# Patient Record
Sex: FEMALE | Race: WHITE | NOT HISPANIC OR LATINO | URBAN - METROPOLITAN AREA
[De-identification: names, ages, dates, MRNs, and addresses within clinical notes are randomized per-mention and may not be internally consistent; named-entity substitution may affect disease eponyms.]

---

## 2019-04-30 ENCOUNTER — EMERGENCY (EMERGENCY)
Facility: HOSPITAL | Age: 84
LOS: 0 days | Discharge: HOME | End: 2019-04-30
Attending: STUDENT IN AN ORGANIZED HEALTH CARE EDUCATION/TRAINING PROGRAM
Payer: MEDICARE

## 2019-04-30 VITALS
DIASTOLIC BLOOD PRESSURE: 41 MMHG | OXYGEN SATURATION: 98 % | SYSTOLIC BLOOD PRESSURE: 114 MMHG | RESPIRATION RATE: 19 BRPM | WEIGHT: 214.95 LBS | TEMPERATURE: 98 F | HEIGHT: 64 IN | HEART RATE: 68 BPM

## 2019-04-30 VITALS
SYSTOLIC BLOOD PRESSURE: 115 MMHG | OXYGEN SATURATION: 95 % | RESPIRATION RATE: 20 BRPM | DIASTOLIC BLOOD PRESSURE: 50 MMHG | TEMPERATURE: 98 F | HEART RATE: 69 BPM

## 2019-04-30 DIAGNOSIS — E78.5 HYPERLIPIDEMIA, UNSPECIFIED: ICD-10-CM

## 2019-04-30 DIAGNOSIS — S20.212A CONTUSION OF LEFT FRONT WALL OF THORAX, INITIAL ENCOUNTER: ICD-10-CM

## 2019-04-30 DIAGNOSIS — S00.81XA ABRASION OF OTHER PART OF HEAD, INITIAL ENCOUNTER: ICD-10-CM

## 2019-04-30 DIAGNOSIS — M19.90 UNSPECIFIED OSTEOARTHRITIS, UNSPECIFIED SITE: ICD-10-CM

## 2019-04-30 DIAGNOSIS — I10 ESSENTIAL (PRIMARY) HYPERTENSION: ICD-10-CM

## 2019-04-30 DIAGNOSIS — Z95.1 PRESENCE OF AORTOCORONARY BYPASS GRAFT: ICD-10-CM

## 2019-04-30 DIAGNOSIS — Z96.651 PRESENCE OF RIGHT ARTIFICIAL KNEE JOINT: ICD-10-CM

## 2019-04-30 DIAGNOSIS — R07.81 PLEURODYNIA: ICD-10-CM

## 2019-04-30 DIAGNOSIS — E11.9 TYPE 2 DIABETES MELLITUS WITHOUT COMPLICATIONS: ICD-10-CM

## 2019-04-30 DIAGNOSIS — S00.93XA CONTUSION OF UNSPECIFIED PART OF HEAD, INITIAL ENCOUNTER: ICD-10-CM

## 2019-04-30 DIAGNOSIS — S09.90XA UNSPECIFIED INJURY OF HEAD, INITIAL ENCOUNTER: ICD-10-CM

## 2019-04-30 DIAGNOSIS — E05.90 THYROTOXICOSIS, UNSPECIFIED WITHOUT THYROTOXIC CRISIS OR STORM: ICD-10-CM

## 2019-04-30 DIAGNOSIS — I25.10 ATHEROSCLEROTIC HEART DISEASE OF NATIVE CORONARY ARTERY WITHOUT ANGINA PECTORIS: ICD-10-CM

## 2019-04-30 DIAGNOSIS — Z96.659 PRESENCE OF UNSPECIFIED ARTIFICIAL KNEE JOINT: Chronic | ICD-10-CM

## 2019-04-30 DIAGNOSIS — Z98.89 OTHER SPECIFIED POSTPROCEDURAL STATES: Chronic | ICD-10-CM

## 2019-04-30 PROCEDURE — 99284 EMERGENCY DEPT VISIT MOD MDM: CPT

## 2019-04-30 PROCEDURE — 72125 CT NECK SPINE W/O DYE: CPT | Mod: 26

## 2019-04-30 PROCEDURE — 76377 3D RENDER W/INTRP POSTPROCES: CPT | Mod: 26

## 2019-04-30 PROCEDURE — 71250 CT THORAX DX C-: CPT | Mod: 26

## 2019-04-30 PROCEDURE — 70450 CT HEAD/BRAIN W/O DYE: CPT | Mod: 26

## 2019-04-30 RX ORDER — TRAMADOL HYDROCHLORIDE 50 MG/1
1 TABLET ORAL
Qty: 12 | Refills: 0
Start: 2019-04-30 | End: 2019-05-02

## 2019-04-30 NOTE — ED ADULT NURSE REASSESSMENT NOTE - NS ED NURSE REASSESS COMMENT FT1
Pt dc home on Tramadol.  Instructed not to take her daughter's Oxycontin as it may be too strong for her and may render her hypoxic by suppressing her respiratory. She is going to send her daughter to fill up the prescription at [facility] located on [facility].

## 2019-04-30 NOTE — ED PROVIDER NOTE - OBJECTIVE STATEMENT
87 year old female presents today biba accompanied with a friend c/o tripping and falling walking outside, she states that she fell face foreward striking the left side of her head then rolled onto her back striking the back of her head, +hematoma to her left head (-) loc (-) neck or back pain +left anterior lower rib pain, (-) chest pain (-) sob (-) nausea or vomiting (-) back pain (-) headache (-) lightheaded or dizziness

## 2019-04-30 NOTE — ED ADULT NURSE REASSESSMENT NOTE - NS ED NURSE REASSESS COMMENT FT1
spoke with  #[id number], sign language, daughter Radhika inquiring about mother.  mother gave permission to talk with Radhika

## 2019-04-30 NOTE — ED PROVIDER NOTE - PMH
CAD (Coronary Artery Disease)    HTN (Hypertension)    Hyperlipidemia    Hyperthyroidism    Laser to correct Glaucoma  Left  Osteoarthritis    Thrombocytopenia    Type II Diabetes Mellitus  last took meds 1 yr ago

## 2019-04-30 NOTE — ED PROVIDER NOTE - PSH
H/O umbilical hernia repair  2009  History of Cholecystectomy    History of total knee replacement  right 2010  S/P CABG X 4  2000  Status Post Arthroscopy of Right Knee  Right  Thyroid Radioactive Iodide  36y/o

## 2019-04-30 NOTE — ED PROVIDER NOTE - CLINICAL SUMMARY MEDICAL DECISION MAKING FREE TEXT BOX
ct pt is s/p fall , ct shows two rib fractures, pulmonary edema with small left pleural effusion, she did not want to stay for blood work stating that " I always have this" pt is in no respiratory distress, oxygen sat on room air is 98%, pt states that she will follow up with her pmd and wants to go home

## 2019-04-30 NOTE — ED ADULT TRIAGE NOTE - CHIEF COMPLAINT QUOTE
patient BIBA patient had a fall hit her head , patient has a small abrasion L side forehead , patient stated " I have a lump on the back of my head also I don't know how I got that because I fall with my face down " denied dizziness denied headache denied N/V denied blurred vision , denied chest pain no problem breathing, patient is taking Aspirin

## 2019-04-30 NOTE — ED PROVIDER NOTE - PROGRESS NOTE DETAILS
pt told of her ct finding, pulmonary edema and small left pleural effusion, pt states that "I always have this" pt denies sob or respiratory distress, oxygen saturation on room air is 98%, pt wants to go home and states she will follow up with her pmd

## 2019-04-30 NOTE — ED PROVIDER NOTE - CARE PLAN
Principal Discharge DX:	Head injury  Secondary Diagnosis:	Facial abrasion, initial encounter  Secondary Diagnosis:	Contusion of rib on left side, initial encounter

## 2019-05-08 ENCOUNTER — INPATIENT (INPATIENT)
Facility: HOSPITAL | Age: 84
LOS: 2 days | Discharge: HOME | End: 2019-05-11
Attending: HOSPITALIST | Admitting: HOSPITALIST
Payer: MEDICARE

## 2019-05-08 VITALS
HEART RATE: 85 BPM | SYSTOLIC BLOOD PRESSURE: 126 MMHG | DIASTOLIC BLOOD PRESSURE: 47 MMHG | TEMPERATURE: 98 F | OXYGEN SATURATION: 100 % | RESPIRATION RATE: 18 BRPM

## 2019-05-08 DIAGNOSIS — Z98.89 OTHER SPECIFIED POSTPROCEDURAL STATES: Chronic | ICD-10-CM

## 2019-05-08 DIAGNOSIS — Z96.659 PRESENCE OF UNSPECIFIED ARTIFICIAL KNEE JOINT: Chronic | ICD-10-CM

## 2019-05-08 LAB
APTT BLD: 34.1 SEC — SIGNIFICANT CHANGE UP (ref 27.5–36.3)
BASE EXCESS BLDV CALC-SCNC: -4.4 MMOL/L — SIGNIFICANT CHANGE UP
BASOPHILS # BLD AUTO: 0.01 K/UL — SIGNIFICANT CHANGE UP (ref 0–0.2)
BASOPHILS NFR BLD AUTO: 0.2 % — SIGNIFICANT CHANGE UP (ref 0–2)
BLOOD GAS VENOUS - CREATININE: 2.36 MG/DL — HIGH (ref 0.5–1.3)
BLOOD GAS VENOUS - FIO2: 50 — SIGNIFICANT CHANGE UP
CHLORIDE BLDV-SCNC: 106 MMOL/L — SIGNIFICANT CHANGE UP (ref 96–108)
EOSINOPHIL # BLD AUTO: 0.24 K/UL — SIGNIFICANT CHANGE UP (ref 0–0.5)
EOSINOPHIL NFR BLD AUTO: 4.6 % — SIGNIFICANT CHANGE UP (ref 0–6)
GAS PNL BLDV: 133 MMOL/L — LOW (ref 136–146)
GLUCOSE BLDV-MCNC: 120 MG/DL — HIGH (ref 70–99)
HCO3 BLDV-SCNC: 20 MMOL/L — SIGNIFICANT CHANGE UP (ref 20–27)
HCT VFR BLD CALC: 20.8 % — CRITICAL LOW (ref 34.5–45)
HCT VFR BLDV CALC: 21.1 % — LOW (ref 34.5–45)
HGB BLD-MCNC: 6.3 G/DL — CRITICAL LOW (ref 11.5–15.5)
HGB BLDV-MCNC: 6.7 G/DL — CRITICAL LOW (ref 11.5–15.5)
IMM GRANULOCYTES NFR BLD AUTO: 0.2 % — SIGNIFICANT CHANGE UP (ref 0–1.5)
INR BLD: 1.18 — HIGH (ref 0.88–1.17)
LACTATE BLDV-MCNC: 1.2 MMOL/L — SIGNIFICANT CHANGE UP (ref 0.5–2)
LYMPHOCYTES # BLD AUTO: 0.93 K/UL — LOW (ref 1–3.3)
LYMPHOCYTES # BLD AUTO: 18 % — SIGNIFICANT CHANGE UP (ref 13–44)
MCHC RBC-ENTMCNC: 30.3 % — LOW (ref 32–36)
MCHC RBC-ENTMCNC: 30.4 PG — SIGNIFICANT CHANGE UP (ref 27–34)
MCV RBC AUTO: 100.5 FL — HIGH (ref 80–100)
MONOCYTES # BLD AUTO: 0.54 K/UL — SIGNIFICANT CHANGE UP (ref 0–0.9)
MONOCYTES NFR BLD AUTO: 10.4 % — SIGNIFICANT CHANGE UP (ref 2–14)
NEUTROPHILS # BLD AUTO: 3.44 K/UL — SIGNIFICANT CHANGE UP (ref 1.8–7.4)
NEUTROPHILS NFR BLD AUTO: 66.6 % — SIGNIFICANT CHANGE UP (ref 43–77)
NRBC # FLD: 0.02 K/UL — SIGNIFICANT CHANGE UP (ref 0–0)
PCO2 BLDV: 44 MMHG — SIGNIFICANT CHANGE UP (ref 41–51)
PH BLDV: 7.3 PH — LOW (ref 7.32–7.43)
PLATELET # BLD AUTO: 109 K/UL — LOW (ref 150–400)
PMV BLD: 11.3 FL — SIGNIFICANT CHANGE UP (ref 7–13)
PO2 BLDV: 23 MMHG — LOW (ref 35–40)
POTASSIUM BLDV-SCNC: 4.2 MMOL/L — SIGNIFICANT CHANGE UP (ref 3.4–4.5)
PROTHROM AB SERPL-ACNC: 13.5 SEC — HIGH (ref 9.8–13.1)
RBC # BLD: 2.07 M/UL — LOW (ref 3.8–5.2)
RBC # FLD: 17.6 % — HIGH (ref 10.3–14.5)
SAO2 % BLDV: 30.5 % — LOW (ref 60–85)
WBC # BLD: 5.17 K/UL — SIGNIFICANT CHANGE UP (ref 3.8–10.5)
WBC # FLD AUTO: 5.17 K/UL — SIGNIFICANT CHANGE UP (ref 3.8–10.5)

## 2019-05-08 NOTE — ED PROVIDER NOTE - PSH
H/O umbilical hernia repair  2009  History of Cholecystectomy    History of total knee replacement  right 2010  S/P CABG X 4  2000  Status Post Arthroscopy of Right Knee  Right  Thyroid Radioactive Iodide  38y/o

## 2019-05-08 NOTE — ED PROVIDER NOTE - OBJECTIVE STATEMENT
Pt is an 88 y/o F w/ a PMHx of CAD s/p CABG, Thrombocytopenia, HTN, HLD who presents after being called for low Hgb 6.1. Pt states she was having darker colored stools possibly black but no BRBPR, no chest pain, shortness of breath, syncope, had colonoscopy sept 2017 and EGD and capsule for GI Anemia workup which was normal. [phone] for anemia and has gotten arenesp in the past. Patient states she desires to go home after blood.     ROS: Denies fevers, chills, CP, Abdominal pain, rhinorrhea, new rashes, new LE edema, new visual changes, confusion, headaches, blood in stools, N/V, dysuria, and hematuria.

## 2019-05-08 NOTE — ED ADULT NURSE NOTE - OBJECTIVE STATEMENT
received to room 10 aox3 in no apparent distress c/o abnormal lab work. Pt states was recently seen at [facility] for fall. Pt states was feeling weak when bending over for keys and then fell down. Pt states was discharged with broken ribs seen on xray but no blood work done. Pt states when to primary MD for follow up and found to have low hemoglobin 6.1 on blood work so was called and sent to ED. Pt states used receive platelet injections/infusions but stopped them because did not like side effects last injection was 3 years ago. Pt was transfused 2 years ago for GI bleed. Pt states began feeling weak before fall but denies increase in weakness since fall. Pt endorses pain to L left side do to fractures. Pt denies any SOB, chest pain, dizziness, headache, abdominal pain, n/v, bloody stool.  Breaths equal and unlabored VSS no signs of active bleeding, pt appears pale. 20 G IV R AC placed labs sent will continue to monitor

## 2019-05-08 NOTE — ED ADULT TRIAGE NOTE - CHIEF COMPLAINT QUOTE
Pt st" I fell last Tuesday I was bending over to pick something up and just fell over....I fell on left side used Med alert was taken to [facility] I fractured 2 ribs on left side...today went to [facility] for follow up they did blood work and got phone call my hgb is down to 6.1 told to go to ER for EMergent blood transfusion"" My hgb was low 2 years ago had tranfusion but they never found anything wrong with me."  Denies shortness of breath , denies feeling lightheaded dizzy. Pt st" Just alittle weakness." Denies blood thinners, hx of htn, diet controlled diabetes. Pt st" I fell last Tuesday I was bending over to pick something up and just fell over....I fell on left side used Med alert was taken to [facility] I fractured 2 ribs on left side...today went to [facility] for follow up they did blood work and got phone call my hgb is down to 6.1 told to go to ER for EMergent blood transfusion"" My hgb was low 2 years ago had tranfusion but they never found anything wrong with me." Appears pale,  Denies shortness of breath , denies feeling lightheaded dizzy. Pt st" Just alittle weakness." Denies blood thinners, hx of htn, diet controlled diabetes.

## 2019-05-08 NOTE — ED PROVIDER NOTE - ATTENDING CONTRIBUTION TO CARE
Attending note:   After face to face evaluation of this patient, I concur with above noted hx, pe, and care plan for this patient.  88 y/o F with slip and fall 10 days ago with two left ribfx now increasingly weak with low blood count [facility]     evaluation in progress

## 2019-05-08 NOTE — ED ADULT NURSE NOTE - NSIMPLEMENTINTERV_GEN_ALL_ED
Implemented All Fall with Harm Risk Interventions:  [place] to call system. Call bell, personal items and telephone within reach. Instruct patient to call for assistance. Room bathroom lighting operational. Non-slip footwear when patient is off stretcher. Physically safe environment: no spills, clutter or unnecessary equipment. Stretcher in lowest position, wheels locked, appropriate side rails in place. Provide visual cue, wrist band, yellow gown, etc. Monitor gait and stability. Monitor for mental status changes and reorient to person, place, and time. Review medications for side effects contributing to fall risk. Reinforce activity limits and safety measures with patient and family. Provide visual clues: red socks.

## 2019-05-08 NOTE — ED PROVIDER NOTE - NS ED ROS FT
ROS: Denies fevers, chills, CP, Abdominal pain, rhinorrhea, new rashes, new LE edema, new visual changes, confusion, headaches, blood in stools, N/V, dysuria, and hematuria.

## 2019-05-08 NOTE — ED PROVIDER NOTE - ENMT, MLM
Airway patent, Nasal mucosa clear. Mouth with normal mucosa. Throat has no vesicles, no oropharyngeal exudates and uvula is midline. Conjunctival pallor

## 2019-05-08 NOTE — ED ADULT NURSE NOTE - CHIEF COMPLAINT QUOTE
Pt st" I fell last Tuesday I was bending over to pick something up and just fell over....I fell on left side used Med alert was taken to [facility] I fractured 2 ribs on left side...today went to [facility] for follow up they did blood work and got phone call my hgb is down to 6.1 told to go to ER for EMergent blood transfusion"" My hgb was low 2 years ago had tranfusion but they never found anything wrong with me."  Denies shortness of breath , denies feeling lightheaded dizzy. Pt st" Just alittle weakness." Denies blood thinners, hx of htn, diet controlled diabetes.

## 2019-05-08 NOTE — ED PROVIDER NOTE - PROGRESS NOTE DETAILS
Patient with LOTS of antibodies on T&S and will need good matching for blood. Spoke to Dr. Young [facility] hospitalist and will admit to his service. Text paged MAR.   -Vic Navarro PGY4 EMIM [employer]#[id number]

## 2019-05-08 NOTE — ED ADULT TRIAGE NOTE - ARRIVAL INFO ADDITIONAL COMMENTS
Pt instructed fall risk precautions to call for asst when ambulating. Pt wheelchair, verbalizing understanding regarding fall risk precautions via teachback method.

## 2019-05-08 NOTE — ED PROVIDER NOTE - GASTROINTESTINAL, MLM
Abdomen soft, non-tender, no guarding. Rectal chaperroned by PCA with normal brown stool no blood or masses felt.

## 2019-05-09 ENCOUNTER — TRANSCRIPTION ENCOUNTER (OUTPATIENT)
Age: 84
End: 2019-05-09

## 2019-05-09 DIAGNOSIS — D64.9 ANEMIA, UNSPECIFIED: ICD-10-CM

## 2019-05-09 DIAGNOSIS — I10 ESSENTIAL (PRIMARY) HYPERTENSION: ICD-10-CM

## 2019-05-09 DIAGNOSIS — E03.9 HYPOTHYROIDISM, UNSPECIFIED: ICD-10-CM

## 2019-05-09 DIAGNOSIS — Z29.9 ENCOUNTER FOR PROPHYLACTIC MEASURES, UNSPECIFIED: ICD-10-CM

## 2019-05-09 DIAGNOSIS — I25.10 ATHEROSCLEROTIC HEART DISEASE OF NATIVE CORONARY ARTERY WITHOUT ANGINA PECTORIS: ICD-10-CM

## 2019-05-09 DIAGNOSIS — M19.90 UNSPECIFIED OSTEOARTHRITIS, UNSPECIFIED SITE: ICD-10-CM

## 2019-05-09 DIAGNOSIS — N17.9 ACUTE KIDNEY FAILURE, UNSPECIFIED: ICD-10-CM

## 2019-05-09 DIAGNOSIS — E11.9 TYPE 2 DIABETES MELLITUS WITHOUT COMPLICATIONS: ICD-10-CM

## 2019-05-09 DIAGNOSIS — D69.6 THROMBOCYTOPENIA, UNSPECIFIED: ICD-10-CM

## 2019-05-09 LAB
ALBUMIN SERPL ELPH-MCNC: 3.4 G/DL — SIGNIFICANT CHANGE UP (ref 3.3–5)
ALP SERPL-CCNC: 63 U/L — SIGNIFICANT CHANGE UP (ref 40–120)
ALT FLD-CCNC: 21 U/L — SIGNIFICANT CHANGE UP (ref 4–33)
ANION GAP SERPL CALC-SCNC: 10 MMO/L — SIGNIFICANT CHANGE UP (ref 7–14)
ANION GAP SERPL CALC-SCNC: 13 MMO/L — SIGNIFICANT CHANGE UP (ref 7–14)
ANISOCYTOSIS BLD QL: SIGNIFICANT CHANGE UP
AST SERPL-CCNC: 25 U/L — SIGNIFICANT CHANGE UP (ref 4–32)
BASOPHILS NFR SPEC: 0.9 % — SIGNIFICANT CHANGE UP (ref 0–2)
BILIRUB SERPL-MCNC: 0.7 MG/DL — SIGNIFICANT CHANGE UP (ref 0.2–1.2)
BLASTS # FLD: 0 % — SIGNIFICANT CHANGE UP (ref 0–0)
BLD GP AB SCN SERPL QL: POSITIVE — SIGNIFICANT CHANGE UP
BUN SERPL-MCNC: 88 MG/DL — HIGH (ref 7–23)
BUN SERPL-MCNC: 94 MG/DL — HIGH (ref 7–23)
CALCIUM SERPL-MCNC: 8.7 MG/DL — SIGNIFICANT CHANGE UP (ref 8.4–10.5)
CALCIUM SERPL-MCNC: 8.8 MG/DL — SIGNIFICANT CHANGE UP (ref 8.4–10.5)
CHLORIDE SERPL-SCNC: 105 MMOL/L — SIGNIFICANT CHANGE UP (ref 98–107)
CHLORIDE SERPL-SCNC: 106 MMOL/L — SIGNIFICANT CHANGE UP (ref 98–107)
CO2 SERPL-SCNC: 18 MMOL/L — LOW (ref 22–31)
CO2 SERPL-SCNC: 19 MMOL/L — LOW (ref 22–31)
CREAT SERPL-MCNC: 2.12 MG/DL — HIGH (ref 0.5–1.3)
CREAT SERPL-MCNC: 2.47 MG/DL — HIGH (ref 0.5–1.3)
EOSINOPHIL NFR FLD: 8.7 % — HIGH (ref 0–6)
FERRITIN SERPL-MCNC: 70.58 NG/ML — SIGNIFICANT CHANGE UP (ref 15–150)
GIANT PLATELETS BLD QL SMEAR: PRESENT — SIGNIFICANT CHANGE UP
GLUCOSE SERPL-MCNC: 122 MG/DL — HIGH (ref 70–99)
GLUCOSE SERPL-MCNC: 137 MG/DL — HIGH (ref 70–99)
HAPTOGLOB SERPL-MCNC: 32 MG/DL — LOW (ref 34–200)
HCT VFR BLD CALC: 26.5 % — LOW (ref 34.5–45)
HGB BLD-MCNC: 8.2 G/DL — LOW (ref 11.5–15.5)
IRON SATN MFR SERPL: 30 UG/DL — SIGNIFICANT CHANGE UP (ref 30–160)
IRON SATN MFR SERPL: 388 UG/DL — SIGNIFICANT CHANGE UP (ref 140–530)
LDH SERPL L TO P-CCNC: 231 U/L — HIGH (ref 135–225)
LYMPHOCYTES NFR SPEC AUTO: 18.2 % — SIGNIFICANT CHANGE UP (ref 13–44)
MACROCYTES BLD QL: SLIGHT — SIGNIFICANT CHANGE UP
MANUAL SMEAR VERIFICATION: SIGNIFICANT CHANGE UP
MCHC RBC-ENTMCNC: 30.9 % — LOW (ref 32–36)
MCHC RBC-ENTMCNC: 31.1 PG — SIGNIFICANT CHANGE UP (ref 27–34)
MCV RBC AUTO: 100.4 FL — HIGH (ref 80–100)
METAMYELOCYTES # FLD: 0 % — SIGNIFICANT CHANGE UP (ref 0–1)
MONOCYTES NFR BLD: 3.5 % — SIGNIFICANT CHANGE UP (ref 2–9)
MYELOCYTES NFR BLD: 0 % — SIGNIFICANT CHANGE UP (ref 0–0)
NEUTROPHIL AB SER-ACNC: 68.7 % — SIGNIFICANT CHANGE UP (ref 43–77)
NEUTS BAND # BLD: 0 % — SIGNIFICANT CHANGE UP (ref 0–6)
NRBC # FLD: 0 K/UL — SIGNIFICANT CHANGE UP (ref 0–0)
OB PNL STL: POSITIVE — SIGNIFICANT CHANGE UP
OTHER - HEMATOLOGY %: 0 — SIGNIFICANT CHANGE UP
PLATELET # BLD AUTO: 91 K/UL — LOW (ref 150–400)
PLATELET COUNT - ESTIMATE: SIGNIFICANT CHANGE UP
PMV BLD: 11.3 FL — SIGNIFICANT CHANGE UP (ref 7–13)
POLYCHROMASIA BLD QL SMEAR: SLIGHT — SIGNIFICANT CHANGE UP
POTASSIUM SERPL-MCNC: 4.1 MMOL/L — SIGNIFICANT CHANGE UP (ref 3.5–5.3)
POTASSIUM SERPL-MCNC: 4.5 MMOL/L — SIGNIFICANT CHANGE UP (ref 3.5–5.3)
POTASSIUM SERPL-SCNC: 4.1 MMOL/L — SIGNIFICANT CHANGE UP (ref 3.5–5.3)
POTASSIUM SERPL-SCNC: 4.5 MMOL/L — SIGNIFICANT CHANGE UP (ref 3.5–5.3)
PROMYELOCYTES # FLD: 0 % — SIGNIFICANT CHANGE UP (ref 0–0)
PROT SERPL-MCNC: 7 G/DL — SIGNIFICANT CHANGE UP (ref 6–8.3)
RBC # BLD: 2.64 M/UL — LOW (ref 3.8–5.2)
RBC # FLD: 16.4 % — HIGH (ref 10.3–14.5)
RETICS #: 139 K/UL — HIGH (ref 25–125)
RETICS/RBC NFR: 6.8 % — HIGH (ref 0.5–2.5)
RH IG SCN BLD-IMP: POSITIVE — SIGNIFICANT CHANGE UP
SODIUM SERPL-SCNC: 135 MMOL/L — SIGNIFICANT CHANGE UP (ref 135–145)
SODIUM SERPL-SCNC: 136 MMOL/L — SIGNIFICANT CHANGE UP (ref 135–145)
UIBC SERPL-MCNC: 358.1 UG/DL — SIGNIFICANT CHANGE UP (ref 110–370)
VARIANT LYMPHS # BLD: 0 % — SIGNIFICANT CHANGE UP
WBC # BLD: 3.92 K/UL — SIGNIFICANT CHANGE UP (ref 3.8–10.5)
WBC # FLD AUTO: 3.92 K/UL — SIGNIFICANT CHANGE UP (ref 3.8–10.5)

## 2019-05-09 PROCEDURE — 86077 PHYS BLOOD BANK SERV XMATCH: CPT

## 2019-05-09 RX ORDER — METOPROLOL TARTRATE 50 MG
100 TABLET ORAL DAILY
Refills: 0 | Status: DISCONTINUED | OUTPATIENT
Start: 2019-05-09 | End: 2019-05-11

## 2019-05-09 RX ORDER — DEXTROSE 50 % IN WATER 50 %
15 SYRINGE (ML) INTRAVENOUS ONCE
Refills: 0 | Status: DISCONTINUED | OUTPATIENT
Start: 2019-05-09 | End: 2019-05-11

## 2019-05-09 RX ORDER — METOPROLOL TARTRATE 50 MG
1 TABLET ORAL
Qty: 0 | Refills: 0 | DISCHARGE

## 2019-05-09 RX ORDER — LEVOTHYROXINE SODIUM 125 MCG
137 TABLET ORAL DAILY
Refills: 0 | Status: DISCONTINUED | OUTPATIENT
Start: 2019-05-09 | End: 2019-05-11

## 2019-05-09 RX ORDER — SODIUM CHLORIDE 9 MG/ML
1000 INJECTION, SOLUTION INTRAVENOUS
Refills: 0 | Status: DISCONTINUED | OUTPATIENT
Start: 2019-05-09 | End: 2019-05-11

## 2019-05-09 RX ORDER — ACETAMINOPHEN 500 MG
650 TABLET ORAL EVERY 6 HOURS
Refills: 0 | Status: DISCONTINUED | OUTPATIENT
Start: 2019-05-09 | End: 2019-05-11

## 2019-05-09 RX ORDER — DEXTROSE 50 % IN WATER 50 %
25 SYRINGE (ML) INTRAVENOUS ONCE
Refills: 0 | Status: DISCONTINUED | OUTPATIENT
Start: 2019-05-09 | End: 2019-05-11

## 2019-05-09 RX ORDER — ESCITALOPRAM OXALATE 10 MG/1
5 TABLET, FILM COATED ORAL DAILY
Refills: 0 | Status: DISCONTINUED | OUTPATIENT
Start: 2019-05-09 | End: 2019-05-11

## 2019-05-09 RX ORDER — ALLOPURINOL 300 MG
100 TABLET ORAL DAILY
Refills: 0 | Status: DISCONTINUED | OUTPATIENT
Start: 2019-05-09 | End: 2019-05-10

## 2019-05-09 RX ORDER — ALLOPURINOL 300 MG
300 TABLET ORAL DAILY
Refills: 0 | Status: DISCONTINUED | OUTPATIENT
Start: 2019-05-09 | End: 2019-05-09

## 2019-05-09 RX ORDER — SIMVASTATIN 20 MG/1
40 TABLET, FILM COATED ORAL AT BEDTIME
Refills: 0 | Status: DISCONTINUED | OUTPATIENT
Start: 2019-05-09 | End: 2019-05-11

## 2019-05-09 RX ORDER — INSULIN LISPRO 100/ML
VIAL (ML) SUBCUTANEOUS
Refills: 0 | Status: DISCONTINUED | OUTPATIENT
Start: 2019-05-09 | End: 2019-05-11

## 2019-05-09 RX ORDER — DEXTROSE 50 % IN WATER 50 %
12.5 SYRINGE (ML) INTRAVENOUS ONCE
Refills: 0 | Status: DISCONTINUED | OUTPATIENT
Start: 2019-05-09 | End: 2019-05-11

## 2019-05-09 RX ORDER — LEVOTHYROXINE SODIUM 125 MCG
1 TABLET ORAL
Qty: 0 | Refills: 0 | DISCHARGE

## 2019-05-09 RX ORDER — ESCITALOPRAM OXALATE 10 MG/1
1 TABLET, FILM COATED ORAL
Qty: 0 | Refills: 0 | DISCHARGE

## 2019-05-09 RX ORDER — GLUCAGON INJECTION, SOLUTION 0.5 MG/.1ML
1 INJECTION, SOLUTION SUBCUTANEOUS ONCE
Refills: 0 | Status: DISCONTINUED | OUTPATIENT
Start: 2019-05-09 | End: 2019-05-11

## 2019-05-09 RX ADMIN — SIMVASTATIN 40 MILLIGRAM(S): 20 TABLET, FILM COATED ORAL at 21:53

## 2019-05-09 RX ADMIN — Medication 100 MILLIGRAM(S): at 17:18

## 2019-05-09 RX ADMIN — Medication 100 MILLIGRAM(S): at 14:25

## 2019-05-09 RX ADMIN — ESCITALOPRAM OXALATE 5 MILLIGRAM(S): 10 TABLET, FILM COATED ORAL at 14:25

## 2019-05-09 RX ADMIN — Medication 137 MICROGRAM(S): at 14:26

## 2019-05-09 NOTE — H&P ADULT - PROBLEM SELECTOR PLAN 2
Cr 2.47  likely pre-renal in the setting of anemia and being on diuretics.  will hold Lasix and Losartan  pt receiving PRBC. will recheck Cr post transfusion  denied urinary symptoms. no dysuria, no urinary urgency/frequency. no bowel/bladder incontinence.

## 2019-05-09 NOTE — DISCHARGE NOTE PROVIDER - CARE PROVIDERS DIRECT ADDRESSES
[email or web address] [facility]

## 2019-05-09 NOTE — H&P ADULT - NSHPLABSRESULTS_GEN_ALL_CORE
LABS:                        6.3    5.17  )-----------( 109      ( 08 May 2019 23:25 )             20.8     05-08    136  |  105  |  94<H>  ----------------------------<  122<H>  4.5   |  18<L>  |  2.47<H>    Ca    8.7      08 May 2019 23:25    TPro  7.0  /  Alb  3.4  /  TBili  0.7  /  DBili  x   /  AST  25  /  ALT  21  /  AlkPhos  63  05-08    PT/INR - ( 08 May 2019 23:25 )   PT: 13.5 SEC;   INR: 1.18          PTT - ( 08 May 2019 23:25 )  PTT:34.1 SEC  CAPILLARY BLOOD GLUCOSE                RADIOLOGY & ADDITIONAL TESTS:    Imaging Personally Reviewed:  [x] YES  [ ] NO    Consultant(s) Notes Reviewed:  [x] YES  [ ] NO    Care Discussed with Consultants/Other Providers [x] YES  [ ] NO

## 2019-05-09 NOTE — DISCHARGE NOTE PROVIDER - HOSPITAL COURSE
86 y/o F w/ a PMHx of CAD s/p CABG, Thrombocytopenia, HTN, HLD who presents after being called for low Hgb 6.1.                         Follow-up with your outpatient provider for further care/recommendations. Monitor for signs/symptoms indicating worsening of disease, such as, easy bleeding/bruising, pale skin, fatigue, dizziness, increased heart rate, or chest pain. Known history of anemia worsen by possible Gi bleed/melena    Hx of Aranesp shots every 3 weeks per pt    Bone marrow bx 2 years ago was told normal.    Pt being followed by Dr. Coleman (heme/onc) at [facility]. Message left, awaiting call back for more history.    Hapto low, LDH not very high. bili normal     Iron studies are within range.     Transfuse 2 units PRBC and monitor hgb    stable hemodynamically    GI consult for melena. (hx of EGD/colon/capsule in [place] 2 years ago per pt)    occult is positive here.    will give clear liquid diet for now.          Problem/Plan - 2:    ·  Problem: LIV (acute kidney injury).  Plan: Cr 2.47    likely pre-renal in the setting of anemia and being on diuretics.    will hold Lasix and Losartan    pt receiving PRBC. will recheck Cr post transfusion    denied urinary symptoms. no dysuria, no urinary urgency/frequency. no bowel/bladder incontinence.          Problem/Plan - 3:    ·  Problem: Thrombocytopenia.  Plan: Plts 109    in the setting of anemia/GI bleed.     on Asa 81 mg.     will monitor.          Problem/Plan - 4:    ·  Problem: Osteoarthritis.  Plan: PRN tylenol    hx of knee replacement.          Type II Diabetes Mellitus:     borderline.    hgb 6.5%    diet and exercise. monitor sugars.    insulin sliding coverage PRN.          Problem/Plan - 6:    Problem: Hypothyroid. Plan: c/w synthroid 137 mcg home dose    check TSH.         Problem/Plan - 7:    ·  Problem: CAD (Coronary Artery Disease).  Plan: on Asa 81 mg qweekly but will hold in the setting of thrombocytopenia and melena/anemia.          Problem/Plan - 8:    ·  Problem: HTN (Hypertension).  Plan: stable BP.    Lasix and Losartan held for LIV.     will add norvasc if needed.          Problem/Plan - 9:    ·  Problem: Prophylactic measure.  Plan: venodynes. 88 y/o F w/ a PMHx of CAD s/p CABG, Thrombocytopenia, HTN, HLD who presents after being called for low Hgb 6.1.                         Follow-up with your outpatient provider for further care/recommendations. Monitor for signs/symptoms indicating worsening of disease, such as, easy bleeding/bruising, pale skin, fatigue, dizziness, increased heart rate, or chest pain. Known history of anemia worsen by possible Gi bleed/melena    Hx of Aranesp shots every 3 weeks per pt    Bone marrow bx 2 years ago was told normal.    Pt being followed by Dr. Coleman (heme/onc) at [facility]. Message left, awaiting call back for more history.    Hapto low, LDH not very high. bili normal     Iron studies are within range.     Transfuse 2 units PRBC and monitor hgb    stable hemodynamically    GI consult for melena. (hx of EGD/colon/capsule in [place] 2 years ago per pt)    occult is positive here.    will give clear liquid diet for now.         LIV (acute kidney injury).  Plan: Cr 2.47, likely pre-renal in the setting of anemia and being on diuretics.    will hold Lasix and Losartan    pt receiving PRBC.    denied urinary symptoms. no dysuria, no urinary urgency/frequency. no bowel/bladder incontinence.           Thrombocytopenia:     Plts 109 in the setting of anemia/GI bleed.     on Asa 81 mg,will monitor.         Osteoarthritis:    PRN tylenol    hx of knee replacement.          Type II Diabetes Mellitus:     borderline.    hgb 6.5%    diet and exercise. monitor sugars.    insulin sliding coverage PRN.          Hypothyroid:    c/w synthroid 137 mcg home dose              CAD (Coronary Artery Disease):     on Asa 81 mg qweekly but will hold in the setting of thrombocytopenia and melena/anemia.         HTN (Hypertension):     stable BP.    Lasix and Losartan held for LIV.     will add norvasc if needed.         Prophylactic measure.  Plan: venodynes. Patient is a 86 y/o F w/ a PMHx of CAD s/p CABG, Thrombocytopenia, HTN, HLD who presents after being called for low Hgb 6.1.     Hx of Aranesp shots every 3 weeks per pt. Bone marrow bx 2 years ago was told normal. Pt being followed by Dr. Coleman (heme/onc) at [facility].         Hospital Course    GI consult for melena. (hx of EGD/colon/capsule in [place] 2 years ago per pt)    occult is positive here.    Started on clear liquid diet     Hapto low, LDH not very high. bili normal     Iron studies are within range.     Received 3 units PRBC with appropriate response        LIV     Cr 2.47, likely pre-renal in the setting of anemia and being on diuretics.    Lasix and Losartan held        Thrombocytopenia:    Plts 109 in the setting of anemia/GI bleed.     on Asa 81 mg,will monitor.         Osteoarthritis:    PRN tylenol    hx of knee replacement.          Type II Diabetes Mellitus:    hgb 6.5%    diet and exercise. monitor sugars.    insulin sliding coverage PRN.         Hypothyroid:    c/w synthroid 137 mcg home dose        CAD (Coronary Artery Disease):     on Asa 81 mg qweekly but will hold in the setting of thrombocytopenia and melena/anemia.         HTN     stable BP.    Lasix and Losartan held for LIV.     will add norvasc if needed. Patient is a 86 y/o F w/ a PMHx of CAD s/p CABG, Thrombocytopenia, HTN, HLD who presents after being called for low Hgb 6.1.     Hx of Aranesp shots every 3 weeks per pt. Bone marrow bx 2 years ago was told normal. Pt being followed by Dr. Coleman (heme/onc) at [facility].         Hospital Course    GI consult for melena. (hx of EGD/colon/capsule in [place] 2 years ago per pt)    occult is positive here.    Started on clear liquid diet     Hapto low, LDH not very high. bili normal     Iron studies are within range.     Received 3 units PRBC with appropriate response    EGD- Non-bleeding esophageal ulcer. Biopsied.                         - Normal stomach.                         - Normal 2nd part of the duodenum.    Recommendation:      - Await pathology results.                                                                                       LIV     Cr 2.47, likely pre-renal in the setting of anemia and being on diuretics.    Lasix and Losartan held        Thrombocytopenia:    Plts 109 in the setting of anemia/GI bleed.     on Asa 81 mg,will monitor.         Osteoarthritis:    PRN tylenol    hx of knee replacement.          Type II Diabetes Mellitus:    hgb 6.5%    diet and exercise. monitor sugars.    insulin sliding coverage PRN.         Hypothyroid:    c/w synthroid 137 mcg home dose        CAD (Coronary Artery Disease):     on Asa 81 mg qweekly but will hold in the setting of thrombocytopenia and melena/anemia.         HTN     stable BP.    Lasix and Losartan held for LIV.     will add norvasc if needed. Patient is a 86 y/o F w/ a PMHx of CAD s/p CABG, Thrombocytopenia, HTN, HLD who presents after being called for low Hgb 6.1.     Hx of Aranesp shots every 3 weeks per pt. Bone marrow bx 2 years ago was told normal. Pt being followed by Dr. Coleman (heme/onc) at [facility].         Hospital Course    GI consult for melena. (hx of EGD/colon/capsule in [place] 2 years ago per pt)    occult is positive here.    Started on clear liquid diet     Hapto low, LDH not very high. bili normal     Iron studies are within range.     Received 3 units PRBC with appropriate response    EGD- Non-bleeding esophageal ulcer. Biopsied.                         - Normal stomach.                         - Normal 2nd part of the duodenum.    Recommendation:      - Await pathology results.                                                                                       LIV     Cr 2.47, likely pre-renal in the setting of anemia and being on diuretics.    Lasix and Losartan held        Thrombocytopenia:    Plts 109 in the setting of anemia/GI bleed.     on Asa 81 mg,will monitor.         Osteoarthritis:    PRN tylenol    hx of knee replacement.          Type II Diabetes Mellitus:    hgb 6.5%    diet and exercise. monitor sugars.    insulin sliding coverage PRN.         Hypothyroid:    c/w synthroid 137 mcg home dose        CAD (Coronary Artery Disease):     on Asa 81 mg qweekly but will hold in the setting of thrombocytopenia and melena/anemia.         HTN     stable BP.    Lasix and Losartan held for LIV.     will add norvasc if needed.             Patient to be discharged on PPI twice daily, holding losartan and potassium, and changed lasix dose to 40 mg once daily. Patient is a 86 y/o F w/ a PMHx of CAD s/p CABG, Thrombocytopenia, HTN, HLD who presents after being called for low Hgb 6.1.     Hx of Aranesp shots every 3 weeks per pt. Bone marrow bx 2 years ago was told normal. Pt being followed by Dr. Coleman (heme/onc) at [facility].         Hospital Course    GI consult for melena. (hx of EGD/colon/capsule in [place] 2 years ago per pt)    occult is positive here.    Started on clear liquid diet     Hapto low, LDH not very high. bili normal     Iron studies are within range.     Received 3 units PRBC with appropriate response    EGD- Non-bleeding esophageal ulcer. Biopsied.                         - Normal stomach.                         - Normal 2nd part of the duodenum.    Recommendation:      - Await pathology results.                                                                                       LIV     Cr 2.47, likely pre-renal in the setting of anemia and being on diuretics.    Lasix and Losartan held        Thrombocytopenia:    Plts 109 in the setting of anemia/GI bleed.     on Asa 81 mg,will monitor.         Osteoarthritis:    PRN tylenol    hx of knee replacement.          Type II Diabetes Mellitus:    hgb 6.5%    diet and exercise. monitor sugars.    insulin sliding coverage PRN.         Hypothyroid:    c/w synthroid 137 mcg home dose        CAD (Coronary Artery Disease):     on Asa 81 mg qweekly but will hold in the setting of thrombocytopenia and melena/anemia.         HTN     stable BP.    Lasix and Losartan held for LIV.     will add norvasc if needed.             Patient to be discharged on PPI twice daily, holding losartan and potassium, and changed lasix dose to 40 mg once daily.          Attending Addendum:     Patient seen and examined by me on the discharge day. please see my progress note from today.     More than 30 mins were spent evaluating patient and coordinating care for discharge.     All questions answered in details. Follow up plan explained./

## 2019-05-09 NOTE — ED ADULT NURSE REASSESSMENT NOTE - NS ED NURSE REASSESS COMMENT FT1
report given to Caron ALFARO pt brought up in no apparent distress VSS blood to be released once pt upstairs accepting RN aware

## 2019-05-09 NOTE — DISCHARGE NOTE PROVIDER - CARE PROVIDER_API CALL
Misael Ralph (DO)  Gastroenterology; Internal Medicine  [address] [place] [address]  Phone: [phone]  Fax: [phone]  Follow Up Time:

## 2019-05-09 NOTE — H&P ADULT - HISTORY OF PRESENT ILLNESS
86 y/o F w/ a PMHx of CAD s/p CABG, Thrombocytopenia, HTN, HLD who presents after being called for low Hgb 6.1. Pt states she was having darker colored stools possibly black but no BRBPR (takes baby aspirin qweekly). no chest pain, shortness of breath, syncope, had colonoscopy sept 2017 and EGD and capsule for GI Anemia workup in [place] which was normal. [phone] for anemia and has gotten arenesp in the past. She was admitted for transfusion (have a lot of antibodies) and acute renal failure, likely pre-renal being on diuretics.  She denied recent illness, no cp, no sob, no n/v/d. no abdominal pain. no headache, no dizziness. Lives alone at home and functionally independent. The son Jitendra and the daughter (deaf) check up on her daily. Have chronic low back pain.

## 2019-05-09 NOTE — H&P ADULT - PROBLEM SELECTOR PLAN 1
Known history of anemia worsen by possible Gi bleed/melena  Hx of Aranesp shots every 3 weeks per pt  Bone marrow bx 2 years ago was told normal.  Pt being followed by Dr. Coleman (heme/onc) at [facility]. Message left, awaiting call back for more history.  Hapto low, LDH not very high. bili normal   Iron studies are within range.   Transfuse 2 units PRBC and monitor hgb  stable hemodynamically  GI consult for melena. (hx of EGD/colon/capsule in [place] 2 years ago per pt)  occult is positive here.  will give clear liquid diet for now Known history of anemia worsen by possible Gi bleed/melena  Baseline hgb ~ 8   Hx of Aranesp shots every 3 weeks per pt  Bone marrow bx 2 years ago was told normal.  Pt being followed by Dr. Coleman (heme/onc) at [facility]. Message left, awaiting call back for more history.  Hapto low, LDH not very high. bili normal   Iron studies are within range.   Transfuse 2 units PRBC and monitor hgb  stable hemodynamically  GI consult for melena. (hx of EGD/colon/capsule in [place] 2 years ago per pt)  occult is positive here.  will give clear liquid diet for now

## 2019-05-09 NOTE — H&P ADULT - NSHPPHYSICALEXAM_GEN_ALL_CORE
PHYSICAL EXAM:  GENERAL: NAD, well-developed, comfortable, obese  HEAD:  Atraumatic, Normocephalic  EYES: EOMI, PERRLA, conjunctiva and sclera clear  NECK: Supple, No JVD  CHEST/LUNG: Clear to auscultation bilaterally; No wheeze  HEART: Regular rate and rhythm; No murmurs, rubs, or gallops  ABDOMEN: Soft, Nontender, Nondistended; Bowel sounds present  Neuro: AAOx3, no focal weakness, 5/5 b/l extremity strength  EXTREMITIES:  2+ Peripheral Pulses, No clubbing, cyanosis, or edema  SKIN: No rashes or lesions

## 2019-05-09 NOTE — CONSULT NOTE ADULT - SUBJECTIVE AND OBJECTIVE BOX
Patient is a 87y Female     Patient is a 87y old  Female who presents with a chief complaint of anemia (09 May 2019 15:24)      HPI:  88 y/o F w/ a PMHx of CAD s/p CABG, Thrombocytopenia, HTN, HLD who presents after being called for low Hgb 6.1. Pt states she was having darker colored stools possibly black but no BRBPR (takes baby aspirin qweekly). no chest pain, shortness of breath, syncope, had colonoscopy sept 2017 and EGD and capsule for GI Anemia workup in [place] which was normal. Pt seeing Dr. Coleman (Heme from [facility] [phone]) for anemia and has gotten arenesp in the past. She was admitted for transfusion (have a lot of antibodies) and acute renal failure, likely pre-renal being on diuretics.  She denied recent illness, no cp, no sob, no n/v/d. no abdominal pain. no headache, no dizziness. Lives alone at home and functionally independent. The son Jitendra and the daughter (deaf) check up on her daily. Have chronic low back pain. (09 May 2019 08:12)      PAST MEDICAL & SURGICAL HISTORY:  Thrombocytopenia  Osteoarthritis  Laser to correct Glaucoma: Left  Hyperthyroidism  Hyperlipidemia  CAD (Coronary Artery Disease)  Type II Diabetes Mellitus: last took meds 1 yr ago  HTN (Hypertension)  History of total knee replacement: right 2010  H/O umbilical hernia repair: 2009  Status Post Arthroscopy of Right Knee: Right  S/P CABG X 4: 2000  History of Cholecystectomy  Thyroid Radioactive Iodide: 38y/o      MEDICATIONS  (STANDING):  allopurinol 100 milliGRAM(s) Oral daily  dextrose 5%. 1000 milliLiter(s) (50 mL/Hr) IV Continuous <Continuous>  dextrose 50% Injectable 12.5 Gram(s) IV Push once  dextrose 50% Injectable 25 Gram(s) IV Push once  dextrose 50% Injectable 25 Gram(s) IV Push once  escitalopram 5 milliGRAM(s) Oral daily  insulin lispro (HumaLOG) corrective regimen sliding scale   SubCutaneous three times a day before meals  levothyroxine 137 MICROGram(s) Oral daily  metoprolol succinate  milliGRAM(s) Oral daily  simvastatin 40 milliGRAM(s) Oral at bedtime      Allergies    No Known Allergies    Intolerances        SOCIAL HISTORY:  Denies ETOh,Smoking,     FAMILY HISTORY:  No pertinent family history in first degree relatives      REVIEW OF SYSTEMS:    CONSTITUTIONAL: No weakness, fevers or chills  EYES/ENT: No visual changes;  No vertigo or throat pain   NECK: No pain or stiffness  RESPIRATORY: No cough, wheezing, hemoptysis; No shortness of breath  CARDIOVASCULAR: No chest pain or palpitations  GASTROINTESTINAL: No abdominal or epigastric pain. No nausea, vomiting, or hematemesis; No diarrhea or constipation. No melena or hematochezia.  GENITOURINARY: No dysuria, frequency or hematuria  NEUROLOGICAL: No numbness or weakness  SKIN: No itching, burning, rashes, or lesions   All other review of systems is negative unless indicated above.    VITAL:  T(C): , Max: 37 (05-08-19 @ 22:10)  T(F): , Max: 98.6 (05-08-19 @ 22:10)  HR: 63 (05-09-19 @ 16:15)  BP: 104/57 (05-09-19 @ 16:15)  BP(mean): --  RR: 18 (05-09-19 @ 16:15)  SpO2: 100% (05-09-19 @ 16:15)  Wt(kg): --    I and O's:        PHYSICAL EXAM:    Constitutional: NAD  HEENT: PERRLA,   Neck: No JVD  Respiratory: CTA B/L  Cardiovascular: S1 and S2  Gastrointestinal: BS+, soft, NT/ND  Extremities: No peripheral edema  Neurological: A/O x 3, no focal deficits  Psychiatric: Normal mood, normal affect  : No Shirley  Skin: No rashes  Access: Not applicable  Back: No CVA tenderness    LABS:                        6.3    5.17  )-----------( 109      ( 08 May 2019 23:25 )             20.8     05-08    136  |  105  |  94<H>  ----------------------------<  122<H>  4.5   |  18<L>  |  2.47<H>    Ca    8.7      08 May 2019 23:25    TPro  7.0  /  Alb  3.4  /  TBili  0.7  /  DBili  x   /  AST  25  /  ALT  21  /  AlkPhos  63  05-08          RADIOLOGY & ADDITIONAL STUDIES:

## 2019-05-09 NOTE — H&P ADULT - NSICDXPASTSURGICALHX_GEN_ALL_CORE_FT
PAST SURGICAL HISTORY:  H/O umbilical hernia repair 2009    History of Cholecystectomy     History of total knee replacement right 2010    S/P CABG X 4 2000    Status Post Arthroscopy of Right Knee Right    Thyroid Radioactive Iodide 38y/o

## 2019-05-09 NOTE — DISCHARGE NOTE PROVIDER - NSDCCPCAREPLAN_GEN_ALL_CORE_FT
PRINCIPAL DISCHARGE DIAGNOSIS  Diagnosis: Anemia  Assessment and Plan of Treatment: You recieved two units of blood on admission. GI Dr. Ayers saw you and recommended __________      SECONDARY DISCHARGE DIAGNOSES  Diagnosis: LIV (acute kidney injury)  Assessment and Plan of Treatment: Lasix and losartan where held in the hospital******   Please monitor kidney levels outpatient with your PCP within 1-2 weeks.    Diagnosis: HTN (Hypertension)  Assessment and Plan of Treatment: Continue blood pressure medication regimen as directed. Monitor for any visual changes, headaches or dizziness.  Monitor blood pressure regularly.  Follow up with your PCP for further management for high blood pressure.       Diagnosis: Hypothyroid  Assessment and Plan of Treatment: Continue with synthroid.    Diagnosis: Type II Diabetes Mellitus  Assessment and Plan of Treatment: Continue consistent carbohydrate diet.  Monitor blood glucose levels throughout the day before meals and at bedtime.  Record blood sugars and bring to outpatient providers appointment in order to be reviewed by your doctor for management modifications.  Be aware of diabetes management symptoms including feeling cool and clammy may be related to low glucose levels.  Feeling hot and dry may indicate high glucose levels.  If  you feel these symptoms, check your blood sugar.  Make regular podiatry appointments in order to have feet checked for wounds and toe nails cut by a doctor to prevent infections. PRINCIPAL DISCHARGE DIAGNOSIS  Diagnosis: Anemia  Assessment and Plan of Treatment: You recieved two units of blood on admission. GI Dr. Ayers saw you and recommended EGD-----------        SECONDARY DISCHARGE DIAGNOSES  Diagnosis: Hypothyroid  Assessment and Plan of Treatment: Continue with synthroid.    Diagnosis: Type II Diabetes Mellitus  Assessment and Plan of Treatment: Continue consistent carbohydrate diet.  Monitor blood glucose levels throughout the day before meals and at bedtime.  Record blood sugars and bring to outpatient providers appointment in order to be reviewed by your doctor for management modifications.  Be aware of diabetes management symptoms including feeling cool and clammy may be related to low glucose levels.  Feeling hot and dry may indicate high glucose levels.  If  you feel these symptoms, check your blood sugar.  Make regular podiatry appointments in order to have feet checked for wounds and toe nails cut by a doctor to prevent infections.      Diagnosis: HTN (Hypertension)  Assessment and Plan of Treatment: Continue blood pressure medication regimen as directed. Monitor for any visual changes, headaches or dizziness.  Monitor blood pressure regularly.  Follow up with your PCP for further management for high blood pressure.       Diagnosis: LIV (acute kidney injury)  Assessment and Plan of Treatment: Lasix and losartan where held in the hospital******   Please monitor kidney levels outpatient with your PCP within 1-2 weeks. PRINCIPAL DISCHARGE DIAGNOSIS  Diagnosis: Anemia  Assessment and Plan of Treatment: You recieved two units of blood on admission. GI Dr. Ayers saw you and recommended EGD-----------        SECONDARY DISCHARGE DIAGNOSES  Diagnosis: Hypothyroid  Assessment and Plan of Treatment: Continue with synthroid.    Diagnosis: Type II Diabetes Mellitus  Assessment and Plan of Treatment: Continue consistent carbohydrate diet.  Monitor blood glucose levels throughout the day before meals and at bedtime.  Record blood sugars and bring to outpatient providers appointment in order to be reviewed by your doctor for management modifications.  Be aware of diabetes management symptoms including feeling cool and clammy may be related to low glucose levels.  Feeling hot and dry may indicate high glucose levels.  If  you feel these symptoms, check your blood sugar.  Make regular podiatry appointments in order to have feet checked for wounds and toe nails cut by a doctor to prevent infections.      Diagnosis: CAD (Coronary Artery Disease)  Assessment and Plan of Treatment: Continue aspirin    Diagnosis: HTN (Hypertension)  Assessment and Plan of Treatment: Continue blood pressure medication regimen as directed. Monitor for any visual changes, headaches or dizziness.  Monitor blood pressure regularly.  Follow up with your PCP for further management for high blood pressure.       Diagnosis: LIV (acute kidney injury)  Assessment and Plan of Treatment: Lasix and losartan where held in the hospital******   Please monitor kidney levels outpatient with your PCP within 1-2 weeks. PRINCIPAL DISCHARGE DIAGNOSIS  Diagnosis: Anemia  Assessment and Plan of Treatment: You recieved two units of blood on admission. GI Dr. Ralph saw you and recommended EGD  EGD showed Non-bleeding esophageal ulcer. which was biopsed  Normal stomach.  Normal 2nd part of the duodenum.                                                                                         SECONDARY DISCHARGE DIAGNOSES  Diagnosis: Hypothyroid  Assessment and Plan of Treatment: Continue with synthroid.    Diagnosis: Type II Diabetes Mellitus  Assessment and Plan of Treatment: Continue consistent carbohydrate diet.  Monitor blood glucose levels throughout the day before meals and at bedtime.  Record blood sugars and bring to outpatient providers appointment in order to be reviewed by your doctor for management modifications.  Be aware of diabetes management symptoms including feeling cool and clammy may be related to low glucose levels.  Feeling hot and dry may indicate high glucose levels.  If  you feel these symptoms, check your blood sugar.  Make regular podiatry appointments in order to have feet checked for wounds and toe nails cut by a doctor to prevent infections.      Diagnosis: CAD (Coronary Artery Disease)  Assessment and Plan of Treatment: Continue aspirin    Diagnosis: HTN (Hypertension)  Assessment and Plan of Treatment: Continue blood pressure medication regimen as directed. Monitor for any visual changes, headaches or dizziness.  Monitor blood pressure regularly.  Follow up with your PCP for further management for high blood pressure.       Diagnosis: LIV (acute kidney injury)  Assessment and Plan of Treatment: Lasix and losartan where held in the hospital******   Please monitor kidney levels outpatient with your PCP within 1-2 weeks. PRINCIPAL DISCHARGE DIAGNOSIS  Diagnosis: Anemia  Assessment and Plan of Treatment: You recieved two units of blood on admission. GI Dr. Ralph saw you and recommended EGD  EGD showed Non-bleeding esophageal ulcer. which was biopsed  Normal stomach.  Normal 2nd part of the duodenum.  Hold aspirin until evaluated as outpatient.                                                                                           SECONDARY DISCHARGE DIAGNOSES  Diagnosis: CAD (Coronary Artery Disease)  Assessment and Plan of Treatment: Hold aspirin until reevaluated outpatient.    Diagnosis: Hypothyroid  Assessment and Plan of Treatment: Continue with synthroid.    Diagnosis: Type II Diabetes Mellitus  Assessment and Plan of Treatment: Continue consistent carbohydrate diet.  Monitor blood glucose levels throughout the day before meals and at bedtime.  Record blood sugars and bring to outpatient providers appointment in order to be reviewed by your doctor for management modifications.  Be aware of diabetes management symptoms including feeling cool and clammy may be related to low glucose levels.  Feeling hot and dry may indicate high glucose levels.  If  you feel these symptoms, check your blood sugar.  Make regular podiatry appointments in order to have feet checked for wounds and toe nails cut by a doctor to prevent infections.      Diagnosis: HTN (Hypertension)  Assessment and Plan of Treatment: Continue blood pressure medication regimen as directed. Monitor for any visual changes, headaches or dizziness.  Monitor blood pressure regularly.  Follow up with your PCP for further management for high blood pressure.       Diagnosis: LIV (acute kidney injury)  Assessment and Plan of Treatment: Lasix and losartan where held in the hospital and your lasix dose changed on discharge,     Please monitor kidney levels outpatient with your PCP within 1-2 weeks.

## 2019-05-09 NOTE — CONSULT NOTE ADULT - ASSESSMENT
pt with gi workup 2 years ago with dr. octavio jenkins at [place] including egd/colon and capsule. pt now with lactose intolerance.  dark stool and drop in hgb.  pt needs at least egd, trfansfuse to 10/30. pt wishes to go home.  risk of worsening anemia, mi death explained.  egd, poss capsule if negative tomorrow. discussed. r/b/a/

## 2019-05-09 NOTE — H&P ADULT - NSICDXPASTMEDICALHX_GEN_ALL_CORE_FT
PAST MEDICAL HISTORY:  CAD (Coronary Artery Disease)     HTN (Hypertension)     Hyperlipidemia     Hyperthyroidism     Laser to correct Glaucoma Left    Osteoarthritis     Thrombocytopenia     Type II Diabetes Mellitus last took meds 1 yr ago

## 2019-05-09 NOTE — H&P ADULT - ASSESSMENT
86 y/o F w/ a PMHx of CAD s/p CABG, Thrombocytopenia, HTN, HLD who presents after being called for low Hgb 6.1. Pt states she was having darker colored stools possibly black.

## 2019-05-10 ENCOUNTER — RESULT REVIEW (OUTPATIENT)
Age: 84
End: 2019-05-10

## 2019-05-10 LAB
ALBUMIN SERPL ELPH-MCNC: 3.31 G/DL — SIGNIFICANT CHANGE UP (ref 3.3–5)
ALP SERPL-CCNC: 62 U/L — SIGNIFICANT CHANGE UP (ref 40–120)
ALT FLD-CCNC: 20 U/L — SIGNIFICANT CHANGE UP (ref 4–33)
ANION GAP SERPL CALC-SCNC: 12 MMO/L — SIGNIFICANT CHANGE UP (ref 7–14)
AST SERPL-CCNC: 23 U/L — SIGNIFICANT CHANGE UP (ref 4–32)
BILIRUB SERPL-MCNC: 1.1 MG/DL — SIGNIFICANT CHANGE UP (ref 0.2–1.2)
BUN SERPL-MCNC: 80 MG/DL — HIGH (ref 7–23)
CALCIUM SERPL-MCNC: 9 MG/DL — SIGNIFICANT CHANGE UP (ref 8.4–10.5)
CHLORIDE SERPL-SCNC: 108 MMOL/L — HIGH (ref 98–107)
CO2 SERPL-SCNC: 19 MMOL/L — LOW (ref 22–31)
CREAT SERPL-MCNC: 2 MG/DL — HIGH (ref 0.5–1.3)
GLUCOSE SERPL-MCNC: 97 MG/DL — SIGNIFICANT CHANGE UP (ref 70–99)
HBA1C BLD-MCNC: 5.1 % — SIGNIFICANT CHANGE UP (ref 4–5.6)
HCT VFR BLD CALC: 26.6 % — LOW (ref 34.5–45)
HGB BLD-MCNC: 8.4 G/DL — LOW (ref 11.5–15.5)
MAGNESIUM SERPL-MCNC: 2.4 MG/DL — SIGNIFICANT CHANGE UP (ref 1.6–2.6)
MCHC RBC-ENTMCNC: 31.6 % — LOW (ref 32–36)
MCHC RBC-ENTMCNC: 31.7 PG — SIGNIFICANT CHANGE UP (ref 27–34)
MCV RBC AUTO: 100.4 FL — HIGH (ref 80–100)
NRBC # FLD: 0 K/UL — SIGNIFICANT CHANGE UP (ref 0–0)
PLATELET # BLD AUTO: 85 K/UL — LOW (ref 150–400)
PMV BLD: 11.6 FL — SIGNIFICANT CHANGE UP (ref 7–13)
POTASSIUM SERPL-MCNC: 4.3 MMOL/L — SIGNIFICANT CHANGE UP (ref 3.5–5.3)
POTASSIUM SERPL-SCNC: 4.3 MMOL/L — SIGNIFICANT CHANGE UP (ref 3.5–5.3)
PROT SERPL-MCNC: 6.7 G/DL — SIGNIFICANT CHANGE UP (ref 6–8.3)
RBC # BLD: 2.65 M/UL — LOW (ref 3.8–5.2)
RBC # FLD: 16.5 % — HIGH (ref 10.3–14.5)
SODIUM SERPL-SCNC: 139 MMOL/L — SIGNIFICANT CHANGE UP (ref 135–145)
TSH SERPL-MCNC: 7.81 UIU/ML — HIGH (ref 0.27–4.2)
WBC # BLD: 4.03 K/UL — SIGNIFICANT CHANGE UP (ref 3.8–10.5)
WBC # FLD AUTO: 4.03 K/UL — SIGNIFICANT CHANGE UP (ref 3.8–10.5)

## 2019-05-10 PROCEDURE — 88305 TISSUE EXAM BY PATHOLOGIST: CPT | Mod: 26

## 2019-05-10 PROCEDURE — 88312 SPECIAL STAINS GROUP 1: CPT | Mod: 26

## 2019-05-10 RX ORDER — ALLOPURINOL 300 MG
300 TABLET ORAL DAILY
Refills: 0 | Status: DISCONTINUED | OUTPATIENT
Start: 2019-05-10 | End: 2019-05-11

## 2019-05-10 RX ADMIN — Medication 300 MILLIGRAM(S): at 23:33

## 2019-05-10 RX ADMIN — ESCITALOPRAM OXALATE 5 MILLIGRAM(S): 10 TABLET, FILM COATED ORAL at 18:49

## 2019-05-10 RX ADMIN — Medication 137 MICROGRAM(S): at 05:41

## 2019-05-10 RX ADMIN — Medication 100 MILLIGRAM(S): at 05:41

## 2019-05-10 RX ADMIN — SIMVASTATIN 40 MILLIGRAM(S): 20 TABLET, FILM COATED ORAL at 21:33

## 2019-05-11 ENCOUNTER — TRANSCRIPTION ENCOUNTER (OUTPATIENT)
Age: 84
End: 2019-05-11

## 2019-05-11 VITALS
SYSTOLIC BLOOD PRESSURE: 126 MMHG | OXYGEN SATURATION: 97 % | RESPIRATION RATE: 17 BRPM | DIASTOLIC BLOOD PRESSURE: 54 MMHG | TEMPERATURE: 98 F | HEART RATE: 66 BPM

## 2019-05-11 LAB
ALBUMIN SERPL ELPH-MCNC: 3.12 G/DL — LOW (ref 3.3–5)
ALP SERPL-CCNC: 61 U/L — SIGNIFICANT CHANGE UP (ref 40–120)
ALT FLD-CCNC: 16 U/L — SIGNIFICANT CHANGE UP (ref 4–33)
ANION GAP SERPL CALC-SCNC: 11 MMO/L — SIGNIFICANT CHANGE UP (ref 7–14)
AST SERPL-CCNC: 23 U/L — SIGNIFICANT CHANGE UP (ref 4–32)
BILIRUB SERPL-MCNC: 1.1 MG/DL — SIGNIFICANT CHANGE UP (ref 0.2–1.2)
BUN SERPL-MCNC: 68 MG/DL — HIGH (ref 7–23)
CALCIUM SERPL-MCNC: 8.8 MG/DL — SIGNIFICANT CHANGE UP (ref 8.4–10.5)
CHLORIDE SERPL-SCNC: 109 MMOL/L — HIGH (ref 98–107)
CO2 SERPL-SCNC: 19 MMOL/L — LOW (ref 22–31)
CREAT SERPL-MCNC: 1.74 MG/DL — HIGH (ref 0.5–1.3)
GLUCOSE SERPL-MCNC: 93 MG/DL — SIGNIFICANT CHANGE UP (ref 70–99)
HCT VFR BLD CALC: 28.8 % — LOW (ref 34.5–45)
HGB BLD-MCNC: 8.9 G/DL — LOW (ref 11.5–15.5)
MCHC RBC-ENTMCNC: 30.9 % — LOW (ref 32–36)
MCHC RBC-ENTMCNC: 31 PG — SIGNIFICANT CHANGE UP (ref 27–34)
MCV RBC AUTO: 100.3 FL — HIGH (ref 80–100)
NRBC # FLD: 0 K/UL — SIGNIFICANT CHANGE UP (ref 0–0)
PLATELET # BLD AUTO: 72 K/UL — LOW (ref 150–400)
PMV BLD: 10.7 FL — SIGNIFICANT CHANGE UP (ref 7–13)
POTASSIUM SERPL-MCNC: 4.1 MMOL/L — SIGNIFICANT CHANGE UP (ref 3.5–5.3)
POTASSIUM SERPL-SCNC: 4.1 MMOL/L — SIGNIFICANT CHANGE UP (ref 3.5–5.3)
PROT SERPL-MCNC: 6.4 G/DL — SIGNIFICANT CHANGE UP (ref 6–8.3)
RBC # BLD: 2.87 M/UL — LOW (ref 3.8–5.2)
RBC # FLD: 15.8 % — HIGH (ref 10.3–14.5)
SODIUM SERPL-SCNC: 139 MMOL/L — SIGNIFICANT CHANGE UP (ref 135–145)
T4 AB SER-ACNC: 5.23 UG/DL — SIGNIFICANT CHANGE UP (ref 5.1–13)
WBC # BLD: 3.61 K/UL — LOW (ref 3.8–10.5)
WBC # FLD AUTO: 3.61 K/UL — LOW (ref 3.8–10.5)

## 2019-05-11 RX ORDER — LOSARTAN POTASSIUM 100 MG/1
1 TABLET, FILM COATED ORAL
Qty: 0 | Refills: 0 | DISCHARGE

## 2019-05-11 RX ORDER — FUROSEMIDE 40 MG
1 TABLET ORAL
Qty: 30 | Refills: 0
Start: 2019-05-11 | End: 2019-06-09

## 2019-05-11 RX ORDER — FUROSEMIDE 40 MG
1 TABLET ORAL
Qty: 0 | Refills: 0 | DISCHARGE

## 2019-05-11 RX ORDER — OMEPRAZOLE 10 MG/1
1 CAPSULE, DELAYED RELEASE ORAL
Qty: 60 | Refills: 0
Start: 2019-05-11 | End: 2019-06-09

## 2019-05-11 RX ADMIN — Medication 100 MILLIGRAM(S): at 05:49

## 2019-05-11 RX ADMIN — ESCITALOPRAM OXALATE 5 MILLIGRAM(S): 10 TABLET, FILM COATED ORAL at 07:32

## 2019-05-11 RX ADMIN — Medication 137 MICROGRAM(S): at 05:49

## 2019-05-11 NOTE — PROGRESS NOTE ADULT - PROBLEM SELECTOR PLAN 2
Cr 2.47 on admisson.   likely pre-renal in the setting of anemia and being on diuretics.  (She takes 80mg lasix in am and 40 mg in pm).  Cr slowly trending down.   will resume Lasix 40 mg qdaily on discharge.   Can hold KCL 20 meq tablets given that she is on much lower dose of Lasix and elevated Cr.   Continue to hold Losartan.  repeat Cr as outpt.   s/p 3 units today. will repeat Cr in am.   denied urinary symptoms. no dysuria, no urinary urgency/frequency. no bowel/bladder incontinence.
Cr 2.47 on admisson.   likely pre-renal in the setting of anemia and being on diuretics.  slowly trending down.   will hold Lasix and Losartan  s/p 3 units today. will repeat Cr in am.   denied urinary symptoms. no dysuria, no urinary urgency/frequency. no bowel/bladder incontinence.

## 2019-05-11 NOTE — DISCHARGE NOTE NURSING/CASE MANAGEMENT/SOCIAL WORK - NSDCDPATPORTLINK_GEN_ALL_CORE
You can access the [email or web address], offered by [facility], by registering with the following website: [email or web address]

## 2019-05-11 NOTE — PROGRESS NOTE ADULT - PROBLEM SELECTOR PLAN 3
Plts 109 on admission, known thrombocytopenia.   Per heme, she may have ITP.   in the setting of anemia/GI bleed.   on Asa 81 mg qweekly, hold for now.   will monitor.
Plts 109 on admission, known thrombocytopenia.   Per heme, she may have ITP.   in the setting of anemia/GI bleed.   on Asa 81 mg qweekly, hold for now.   will monitor.

## 2019-05-11 NOTE — PROGRESS NOTE ADULT - PROBLEM SELECTOR PLAN 8
stable BP.  Lasix and Losartan held for LIV.   Will restart low dose Lasix 40 mg qdaily on discharge.

## 2019-05-11 NOTE — PROGRESS NOTE ADULT - ASSESSMENT
88 y/o F w/ a PMHx of CAD s/p CABG, Thrombocytopenia, HTN, HLD who presents after being called for low Hgb 6.1. Pt states she was having darker colored stools possibly black.
88 y/o F w/ a PMHx of CAD s/p CABG, Thrombocytopenia, HTN, HLD who presents after being called for low Hgb 6.1. Pt states she was having darker colored stools possibly black.
dark stool and anemia, for egd today.  would transfuse another prbc prior to dc.
endosocpy showed ulcer, pt needs ppi bid and egd in 6-8 weeks to rule out malignayc and confirm healing

## 2019-05-11 NOTE — PROGRESS NOTE ADULT - PROBLEM SELECTOR PLAN 1
Known history of anemia worsen by possible Gi bleed/melena  Baseline hgb ~ 8   Hx of Aranesp shots every 3 weeks per pt  Bone marrow bx 2 years ago without significant etiology.   Pt being followed by Dr. Coleman (heme/onc) at [facility]. discussed case.  Hapto low, LDH not very high. bili normal. Unimpressive numbers per heme.   Iron studies are within range.   s/p 3 units PRBC this admission. Hgb stable.   stable hemodynamically  GI Dr. Ralph on the case. (hx of EGD/colon/capsule in [place] 2 years ago per pt)  occult is positive.   EGD with non-bleeding esophageal ulcer. plan for PPI BID on discharge.  (hold aspirin for 2 weeks).   outpt GI f/u for repeat EGD in 6-8 weeks.
Known history of anemia worsen by possible Gi bleed/melena  Baseline hgb ~ 8   Hx of Aranesp shots every 3 weeks per pt  Bone marrow bx 2 years ago without significant etiology.   Pt being followed by Dr. Coleman (heme/onc) at [facility]. discussed case.  Hapto low, LDH not very high. bili normal. Unimpressive numbers per heme.   Iron studies are within range.   s/p 2 units PRBC on admission plus 1 more today.  stable hemodynamically  GI consulted for melena. (hx of EGD/colon/capsule in [place] 2 years ago per pt)  occult is positive.   EGD with non-bleeding esophageal ulcer.

## 2019-05-11 NOTE — PROGRESS NOTE ADULT - SUBJECTIVE AND OBJECTIVE BOX
Patient is a 87y old  Female who presents with a chief complaint of anemia (11 May 2019 09:57)      SUBJECTIVE / OVERNIGHT EVENTS:  doing well.  no cp, no sob, no n/v/d. no abdominal pain.  no headache, no dizziness.  no bleeding.  hgb stable.       Vital Signs Last 24 Hrs  T(C): 36.7 (11 May 2019 05:46), Max: 36.7 (10 May 2019 22:10)  T(F): 98.1 (11 May 2019 05:46), Max: 98.1 (10 May 2019 22:10)  HR: 66 (11 May 2019 05:46) (66 - 67)  BP: 126/54 (11 May 2019 05:46) (126/54 - 146/55)  BP(mean): --  RR: 17 (11 May 2019 05:46) (16 - 17)  SpO2: 97% (11 May 2019 05:46) (97% - 99%)  I&O's Summary      PHYSICAL EXAM:  GENERAL: NAD, Comfortable  HEAD:  Atraumatic, Normocephalic  EYES: EOMI, PERRLA, conjunctiva and sclera clear  NECK: Supple, No JVD  CHEST/LUNG: Clear to auscultation bilaterally; No wheeze  HEART: Regular rate and rhythm; No murmurs, rubs, or gallops  ABDOMEN: Soft, Nontender, Nondistended; Bowel sounds present  Neuro: AAO x 3, no focal deficit, 5/5 b/l extremities  EXTREMITIES:  2+ Peripheral Pulses, No clubbing, cyanosis, or edema  SKIN: No rashes or lesions    LABS:                        8.9    3.61  )-----------( 72       ( 11 May 2019 05:45 )             28.8     05-11    139  |  109<H>  |  68<H>  ----------------------------<  93  4.1   |  19<L>  |  1.74<H>    Ca    8.8      11 May 2019 05:45  Mg     2.4     05-10    TPro  6.4  /  Alb  3.12<L>  /  TBili  1.1  /  DBili  x   /  AST  23  /  ALT  16  /  AlkPhos  61  05-11      CAPILLARY BLOOD GLUCOSE      POCT Blood Glucose.: 142 mg/dL (11 May 2019 07:29)  POCT Blood Glucose.: 158 mg/dL (10 May 2019 22:17)  POCT Blood Glucose.: 104 mg/dL (10 May 2019 18:48)            RADIOLOGY & ADDITIONAL TESTS:    Imaging Personally Reviewed:  [x] YES  [ ] NO    Consultant(s) Notes Reviewed:  [x] YES  [ ] NO      MEDICATIONS  (STANDING):  allopurinol 300 milliGRAM(s) Oral daily  dextrose 5%. 1000 milliLiter(s) (50 mL/Hr) IV Continuous <Continuous>  dextrose 50% Injectable 12.5 Gram(s) IV Push once  dextrose 50% Injectable 25 Gram(s) IV Push once  dextrose 50% Injectable 25 Gram(s) IV Push once  escitalopram 5 milliGRAM(s) Oral daily  insulin lispro (HumaLOG) corrective regimen sliding scale   SubCutaneous three times a day before meals  levothyroxine 137 MICROGram(s) Oral daily  metoprolol succinate  milliGRAM(s) Oral daily  simvastatin 40 milliGRAM(s) Oral at bedtime    MEDICATIONS  (PRN):  acetaminophen   Tablet .. 650 milliGRAM(s) Oral every 6 hours PRN Mild Pain (1 - 3)  dextrose 40% Gel 15 Gram(s) Oral once PRN Blood Glucose LESS THAN 70 milliGRAM(s)/deciliter  glucagon  Injectable 1 milliGRAM(s) IntraMuscular once PRN Glucose LESS THAN 70 milligrams/deciliter      Care Discussed with Consultants/Other Providers [x] YES  [ ] NO    HEALTH ISSUES - PROBLEM Dx:  CAD (Coronary Artery Disease): CAD (Coronary Artery Disease)
ANESTHESIA POSTOP CHECK    87y Female POSTOP DAY 1 S/P MAC/ IVAS anesthesia for EGD on 5/10/2019    Vital Signs Last 24 Hrs  T(C): 36.7 (11 May 2019 05:46), Max: 36.7 (10 May 2019 22:10)  T(F): 98.1 (11 May 2019 05:46), Max: 98.1 (10 May 2019 22:10)  HR: 66 (11 May 2019 05:46) (66 - 67)  BP: 126/54 (11 May 2019 05:46) (126/54 - 146/55)  BP(mean): --  RR: 17 (11 May 2019 05:46) (16 - 17)  SpO2: 97% (11 May 2019 05:46) (97% - 99%)  I&O's Summary      [ x] NO APPARENT ANESTHESIA COMPLICATIONS      Comments:
KARUNA CAICEDO:[id number],   87yFemale followed for:  No Known Allergies    PAST MEDICAL & SURGICAL HISTORY:  Thrombocytopenia  Osteoarthritis  Laser to correct Glaucoma: Left  Hyperthyroidism  Hyperlipidemia  CAD (Coronary Artery Disease)  Type II Diabetes Mellitus: last took meds 1 yr ago  HTN (Hypertension)  History of total knee replacement: right 2010  H/O umbilical hernia repair: 2009  Status Post Arthroscopy of Right Knee: Right  S/P CABG X 4: 2000  History of Cholecystectomy  Thyroid Radioactive Iodide: 38y/o    FAMILY HISTORY:  No pertinent family history in first degree relatives    MEDICATIONS  (STANDING):  allopurinol 100 milliGRAM(s) Oral daily  dextrose 5%. 1000 milliLiter(s) (50 mL/Hr) IV Continuous <Continuous>  dextrose 50% Injectable 12.5 Gram(s) IV Push once  dextrose 50% Injectable 25 Gram(s) IV Push once  dextrose 50% Injectable 25 Gram(s) IV Push once  escitalopram 5 milliGRAM(s) Oral daily  insulin lispro (HumaLOG) corrective regimen sliding scale   SubCutaneous three times a day before meals  levothyroxine 137 MICROGram(s) Oral daily  metoprolol succinate  milliGRAM(s) Oral daily  simvastatin 40 milliGRAM(s) Oral at bedtime    MEDICATIONS  (PRN):  acetaminophen   Tablet .. 650 milliGRAM(s) Oral every 6 hours PRN Mild Pain (1 - 3)  dextrose 40% Gel 15 Gram(s) Oral once PRN Blood Glucose LESS THAN 70 milliGRAM(s)/deciliter  glucagon  Injectable 1 milliGRAM(s) IntraMuscular once PRN Glucose LESS THAN 70 milligrams/deciliter      Vital Signs Last 24 Hrs  T(C): 36.4 (10 May 2019 05:36), Max: 36.6 (09 May 2019 20:38)  T(F): 97.6 (10 May 2019 05:36), Max: 97.9 (09 May 2019 20:38)  HR: 58 (10 May 2019 05:36) (56 - 63)  BP: 124/52 (10 May 2019 05:36) (104/57 - 128/54)  BP(mean): --  RR: 18 (10 May 2019 05:36) (18 - 18)  SpO2: 95% (10 May 2019 05:36) (95% - 100%)  nc/at  s1s2  cta  soft, nt, nd no guarding or rebound  no c/c/e    CBC Full  -  ( 09 May 2019 19:02 )  WBC Count : 3.92 K/uL  RBC Count : 2.64 M/uL  Hemoglobin : 8.2 g/dL  Hematocrit : 26.5 %  Platelet Count - Automated : 91 K/uL  Mean Cell Volume : 100.4 fL  Mean Cell Hemoglobin : 31.1 pg  Mean Cell Hemoglobin Concentration : 30.9 %  Auto Neutrophil # : x  Auto Lymphocyte # : x  Auto Monocyte # : x  Auto Eosinophil # : x  Auto Basophil # : x  Auto Neutrophil % : x  Auto Lymphocyte % : x  Auto Monocyte % : x  Auto Eosinophil % : x  Auto Basophil % : x    05-09    135  |  106  |  88<H>  ----------------------------<  137<H>  4.1   |  19<L>  |  2.12<H>    Ca    8.8      09 May 2019 19:02    TPro  7.0  /  Alb  3.4  /  TBili  0.7  /  DBili  x   /  AST  25  /  ALT  21  /  AlkPhos  63  05-08    PT/INR - ( 08 May 2019 23:25 )   PT: 13.5 SEC;   INR: 1.18          PTT - ( 08 May 2019 23:25 )  PTT:34.1 SEC
KARUNA CAICEDO:[id number],   87yFemale followed for:  No Known Allergies    PAST MEDICAL & SURGICAL HISTORY:  Thrombocytopenia  Osteoarthritis  Laser to correct Glaucoma: Left  Hyperthyroidism  Hyperlipidemia  CAD (Coronary Artery Disease)  Type II Diabetes Mellitus: last took meds 1 yr ago  HTN (Hypertension)  History of total knee replacement: right 2010  H/O umbilical hernia repair: 2009  Status Post Arthroscopy of Right Knee: Right  S/P CABG X 4: 2000  History of Cholecystectomy  Thyroid Radioactive Iodide: 38y/o    FAMILY HISTORY:  No pertinent family history in first degree relatives    MEDICATIONS  (STANDING):  allopurinol 300 milliGRAM(s) Oral daily  dextrose 5%. 1000 milliLiter(s) (50 mL/Hr) IV Continuous <Continuous>  dextrose 50% Injectable 12.5 Gram(s) IV Push once  dextrose 50% Injectable 25 Gram(s) IV Push once  dextrose 50% Injectable 25 Gram(s) IV Push once  escitalopram 5 milliGRAM(s) Oral daily  insulin lispro (HumaLOG) corrective regimen sliding scale   SubCutaneous three times a day before meals  levothyroxine 137 MICROGram(s) Oral daily  metoprolol succinate  milliGRAM(s) Oral daily  simvastatin 40 milliGRAM(s) Oral at bedtime    MEDICATIONS  (PRN):  acetaminophen   Tablet .. 650 milliGRAM(s) Oral every 6 hours PRN Mild Pain (1 - 3)  dextrose 40% Gel 15 Gram(s) Oral once PRN Blood Glucose LESS THAN 70 milliGRAM(s)/deciliter  glucagon  Injectable 1 milliGRAM(s) IntraMuscular once PRN Glucose LESS THAN 70 milligrams/deciliter      Vital Signs Last 24 Hrs  T(C): 36.7 (11 May 2019 05:46), Max: 36.7 (10 May 2019 22:10)  T(F): 98.1 (11 May 2019 05:46), Max: 98.1 (10 May 2019 22:10)  HR: 66 (11 May 2019 05:46) (55 - 67)  BP: 126/54 (11 May 2019 05:46) (118/54 - 146/55)  BP(mean): --  RR: 17 (11 May 2019 05:46) (16 - 17)  SpO2: 97% (11 May 2019 05:46) (97% - 99%)  nc/at  s1s2  cta  soft, nt, nd no guarding or rebound  no c/c/e    CBC Full  -  ( 11 May 2019 05:45 )  WBC Count : 3.61 K/uL  RBC Count : 2.87 M/uL  Hemoglobin : 8.9 g/dL  Hematocrit : 28.8 %  Platelet Count - Automated : 72 K/uL  Mean Cell Volume : 100.3 fL  Mean Cell Hemoglobin : 31.0 pg  Mean Cell Hemoglobin Concentration : 30.9 %  Auto Neutrophil # : x  Auto Lymphocyte # : x  Auto Monocyte # : x  Auto Eosinophil # : x  Auto Basophil # : x  Auto Neutrophil % : x  Auto Lymphocyte % : x  Auto Monocyte % : x  Auto Eosinophil % : x  Auto Basophil % : x    05-11    139  |  109<H>  |  68<H>  ----------------------------<  93  4.1   |  19<L>  |  1.74<H>    Ca    8.8      11 May 2019 05:45  Mg     2.4     05-10    TPro  6.4  /  Alb  3.12<L>  /  TBili  1.1  /  DBili  x   /  AST  23  /  ALT  16  /  AlkPhos  61  05-11
Patient is a 87y old  Female who presents with a chief complaint of anemia (10 May 2019 07:13)      SUBJECTIVE / OVERNIGHT EVENTS:  feel well.  the daughter at bedside.  EGD today.  no cp, no sob, no n/v/d. no abdominal pain.  no headache, no dizziness.       Vital Signs Last 24 Hrs  T(C): 36.6 (10 May 2019 10:15), Max: 36.6 (09 May 2019 20:38)  T(F): 97.8 (10 May 2019 10:15), Max: 97.9 (09 May 2019 20:38)  HR: 55 (10 May 2019 10:15) (55 - 58)  BP: 118/54 (10 May 2019 10:15) (118/54 - 124/52)  BP(mean): --  RR: 17 (10 May 2019 10:15) (17 - 18)  SpO2: 97% (10 May 2019 10:15) (95% - 100%)  I&O's Summary      PHYSICAL EXAM:  GENERAL: NAD, Comfortable  HEAD:  Atraumatic, Normocephalic  EYES: EOMI, PERRLA, conjunctiva and sclera clear  NECK: Supple, No JVD  CHEST/LUNG: Clear to auscultation bilaterally; No wheeze  HEART: Regular rate and rhythm; No murmurs, rubs, or gallops  ABDOMEN: Soft, Nontender, Nondistended; Bowel sounds present  Neuro: AAO x 3, no focal deficit, 5/5 b/l extremities  EXTREMITIES:  2+ Peripheral Pulses, No clubbing, cyanosis, or edema  SKIN: No rashes or lesions    LABS:                        8.4    4.03  )-----------( 85       ( 10 May 2019 06:11 )             26.6     05-10    139  |  108<H>  |  80<H>  ----------------------------<  97  4.3   |  19<L>  |  2.00<H>    Ca    9.0      10 May 2019 06:11  Mg     2.4     05-10    TPro  6.7  /  Alb  3.31  /  TBili  1.1  /  DBili  x   /  AST  23  /  ALT  20  /  AlkPhos  62  05-10    PT/INR - ( 08 May 2019 23:25 )   PT: 13.5 SEC;   INR: 1.18          PTT - ( 08 May 2019 23:25 )  PTT:34.1 SEC  CAPILLARY BLOOD GLUCOSE      POCT Blood Glucose.: 106 mg/dL (10 May 2019 11:14)  POCT Blood Glucose.: 101 mg/dL (10 May 2019 07:49)  POCT Blood Glucose.: 107 mg/dL (09 May 2019 21:33)            RADIOLOGY & ADDITIONAL TESTS:    Imaging Personally Reviewed:  [x] YES  [ ] NO    Consultant(s) Notes Reviewed:  [x] YES  [ ] NO      MEDICATIONS  (STANDING):  allopurinol 300 milliGRAM(s) Oral daily  dextrose 5%. 1000 milliLiter(s) (50 mL/Hr) IV Continuous <Continuous>  dextrose 50% Injectable 12.5 Gram(s) IV Push once  dextrose 50% Injectable 25 Gram(s) IV Push once  dextrose 50% Injectable 25 Gram(s) IV Push once  escitalopram 5 milliGRAM(s) Oral daily  insulin lispro (HumaLOG) corrective regimen sliding scale   SubCutaneous three times a day before meals  levothyroxine 137 MICROGram(s) Oral daily  metoprolol succinate  milliGRAM(s) Oral daily  simvastatin 40 milliGRAM(s) Oral at bedtime    MEDICATIONS  (PRN):  acetaminophen   Tablet .. 650 milliGRAM(s) Oral every 6 hours PRN Mild Pain (1 - 3)  dextrose 40% Gel 15 Gram(s) Oral once PRN Blood Glucose LESS THAN 70 milliGRAM(s)/deciliter  glucagon  Injectable 1 milliGRAM(s) IntraMuscular once PRN Glucose LESS THAN 70 milligrams/deciliter      Care Discussed with Consultants/Other Providers [x] YES  [ ] NO    HEALTH ISSUES - PROBLEM Dx:  CAD (Coronary Artery Disease): CAD (Coronary Artery Disease)

## 2020-10-22 NOTE — ED ADULT TRIAGE NOTE - BP NONINVASIVE DIASTOLIC (MM HG)
Chief Complaint   Patient presents with    Follow-up     hgb     Visit Vitals  /74   Pulse 89   Ht 5' 3\" (1.6 m)   Wt 135 lb (61.2 kg)   LMP 07/30/2020 Comment: 10/10/2020 period end date   SpO2 99%   BMI 23.91 kg/m²     1. Have you been to the ER, urgent care clinic since your last visit? Hospitalized since your last visit?no    2. Have you seen or consulted any other health care providers outside of the [address] [place] [address] since your last visit? Include any pap smears or colon screening.  Gyn      Recent Results (from the past 12 hour(s))   AMB POC HEMOGLOBIN (HGB)    Collection Time: 10/22/20  4:44 PM   Result Value Ref Range    Hemoglobin (POC) 5.5 41

## 2021-10-08 NOTE — H&P ADULT - NSHPREVIEWOFSYSTEMS_GEN_ALL_CORE
General: +weakness, no fever/chills, no weight loss/gain  Skin/Breast: no rash, no jaundice  Ophthalmologic: no vision changes, no dry eyes   Respiratory and Thorax: no cough, no wheezing, no hemoptysis, no dyspnea  Cardiovascular: no chest pain, no shortness of breath, no orthopnea  Gastrointestinal: no n/v/d, no abdominal pain, no dysphagia, +melena   Genitourinary: no dysuria, no frequency, no nocturia, no hematuria  Musculoskeletal: no trauma, no sprain/strain, no myalgias, no arthralgias, no fracture  Neurological: no HA, no dizziness, no weakness, no numbness  Psychiatric: no depression, no SI/HI  Hematology/Lymphatics: no easy bruising  Endocrine: no heat or cold intolerance. no weight gain or loss  Allergic/Immunologic: no allergy or recent reaction
This document is complete and the patient is ready for discharge.

## 2024-04-22 NOTE — PATIENT PROFILE ADULT - SURGICAL SITE INCISION
How Severe Is This Condition?: severe Additional History: MOP states multiple topical steroids have been tried with no relief. MOP states multiple moisturizers have been tried as well. Rash begins as blisters with yellow discharge and progresses to erythematous rash. Cheeks, creases behind ears and flexural creases of knees primarily affected. no

## 2024-12-27 NOTE — DISCHARGE NOTE PROVIDER - NSDCDCMDCOMP_GEN_ALL_CORE
This document is complete and the patient is ready for discharge.
Detail Level: Zone
Patient Specific Otc Recommendations (Will Not Stick From Patient To Patient): Gentle skin care